# Patient Record
Sex: MALE | Race: WHITE | NOT HISPANIC OR LATINO | Employment: FULL TIME | URBAN - METROPOLITAN AREA
[De-identification: names, ages, dates, MRNs, and addresses within clinical notes are randomized per-mention and may not be internally consistent; named-entity substitution may affect disease eponyms.]

---

## 2021-11-02 ENCOUNTER — HOSPITAL ENCOUNTER (EMERGENCY)
Facility: HOSPITAL | Age: 26
Discharge: HOME | End: 2021-11-02
Attending: EMERGENCY MEDICINE | Admitting: EMERGENCY MEDICINE

## 2021-11-02 ENCOUNTER — APPOINTMENT (OUTPATIENT)
Dept: CT IMAGING | Facility: HOSPITAL | Age: 26
End: 2021-11-02

## 2021-11-02 VITALS
WEIGHT: 158 LBS | DIASTOLIC BLOOD PRESSURE: 101 MMHG | HEART RATE: 86 BPM | OXYGEN SATURATION: 100 % | HEIGHT: 69 IN | SYSTOLIC BLOOD PRESSURE: 150 MMHG | BODY MASS INDEX: 23.4 KG/M2 | TEMPERATURE: 98.4 F | RESPIRATION RATE: 18 BRPM

## 2021-11-02 DIAGNOSIS — R10.11 RIGHT UPPER QUADRANT ABDOMINAL PAIN: Primary | ICD-10-CM

## 2021-11-02 LAB
ALBUMIN SERPL-MCNC: 4.9 G/DL (ref 3.5–5.2)
ALBUMIN/GLOB SERPL: 2.1 G/DL
ALP SERPL-CCNC: 74 U/L (ref 39–117)
ALT SERPL W P-5'-P-CCNC: 33 U/L (ref 1–41)
AMPHET+METHAMPHET UR QL: NEGATIVE
ANION GAP SERPL CALCULATED.3IONS-SCNC: 15 MMOL/L (ref 5–15)
APAP SERPL-MCNC: <5 MCG/ML (ref 0–30)
APTT PPP: 27 SECONDS (ref 24–31)
AST SERPL-CCNC: 27 U/L (ref 1–40)
BARBITURATES UR QL SCN: NEGATIVE
BASOPHILS # BLD AUTO: 0 10*3/MM3 (ref 0–0.2)
BASOPHILS NFR BLD AUTO: 0.3 % (ref 0–1.5)
BENZODIAZ UR QL SCN: NEGATIVE
BILIRUB SERPL-MCNC: 0.5 MG/DL (ref 0–1.2)
BUN SERPL-MCNC: 10 MG/DL (ref 6–20)
BUN/CREAT SERPL: 13.5 (ref 7–25)
CALCIUM SPEC-SCNC: 9.8 MG/DL (ref 8.6–10.5)
CANNABINOIDS SERPL QL: NEGATIVE
CHLORIDE SERPL-SCNC: 99 MMOL/L (ref 98–107)
CK SERPL-CCNC: 73 U/L (ref 20–200)
CO2 SERPL-SCNC: 24 MMOL/L (ref 22–29)
COCAINE UR QL: POSITIVE
CREAT SERPL-MCNC: 0.74 MG/DL (ref 0.76–1.27)
DEPRECATED RDW RBC AUTO: 40.3 FL (ref 37–54)
EOSINOPHIL # BLD AUTO: 0.1 10*3/MM3 (ref 0–0.4)
EOSINOPHIL NFR BLD AUTO: 1.4 % (ref 0.3–6.2)
ERYTHROCYTE [DISTWIDTH] IN BLOOD BY AUTOMATED COUNT: 12.6 % (ref 12.3–15.4)
ETHANOL UR QL: <0.01 %
GFR SERPL CREATININE-BSD FRML MDRD: 128 ML/MIN/1.73
GFR SERPL CREATININE-BSD FRML MDRD: >150 ML/MIN/1.73
GLOBULIN UR ELPH-MCNC: 2.3 GM/DL
GLUCOSE SERPL-MCNC: 89 MG/DL (ref 65–99)
HAV IGM SERPL QL IA: NORMAL
HBV CORE IGM SERPL QL IA: NORMAL
HBV SURFACE AG SERPL QL IA: NORMAL
HCT VFR BLD AUTO: 44.1 % (ref 37.5–51)
HCV AB SER DONR QL: NORMAL
HGB BLD-MCNC: 15.2 G/DL (ref 13–17.7)
INR PPP: 1.03 (ref 0.93–1.1)
LIPASE SERPL-CCNC: 31 U/L (ref 13–60)
LYMPHOCYTES # BLD AUTO: 1.1 10*3/MM3 (ref 0.7–3.1)
LYMPHOCYTES NFR BLD AUTO: 13.2 % (ref 19.6–45.3)
MAGNESIUM SERPL-MCNC: 2 MG/DL (ref 1.6–2.6)
MCH RBC QN AUTO: 30.7 PG (ref 26.6–33)
MCHC RBC AUTO-ENTMCNC: 34.4 G/DL (ref 31.5–35.7)
MCV RBC AUTO: 89.1 FL (ref 79–97)
METHADONE UR QL SCN: NEGATIVE
MONOCYTES # BLD AUTO: 0.5 10*3/MM3 (ref 0.1–0.9)
MONOCYTES NFR BLD AUTO: 5.7 % (ref 5–12)
NEUTROPHILS NFR BLD AUTO: 6.7 10*3/MM3 (ref 1.7–7)
NEUTROPHILS NFR BLD AUTO: 79.4 % (ref 42.7–76)
NRBC BLD AUTO-RTO: 0 /100 WBC (ref 0–0.2)
OPIATES UR QL: NEGATIVE
OXYCODONE UR QL SCN: NEGATIVE
PLATELET # BLD AUTO: 232 10*3/MM3 (ref 140–450)
PMV BLD AUTO: 7.1 FL (ref 6–12)
POTASSIUM SERPL-SCNC: 3.8 MMOL/L (ref 3.5–5.2)
PROT SERPL-MCNC: 7.2 G/DL (ref 6–8.5)
PROTHROMBIN TIME: 11.4 SECONDS (ref 9.6–11.7)
RBC # BLD AUTO: 4.95 10*6/MM3 (ref 4.14–5.8)
SALICYLATES SERPL-MCNC: <0.3 MG/DL
SODIUM SERPL-SCNC: 138 MMOL/L (ref 136–145)
TSH SERPL DL<=0.05 MIU/L-ACNC: 0.77 UIU/ML (ref 0.27–4.2)
WBC # BLD AUTO: 8.4 10*3/MM3 (ref 3.4–10.8)

## 2021-11-02 PROCEDURE — 80307 DRUG TEST PRSMV CHEM ANLYZR: CPT | Performed by: EMERGENCY MEDICINE

## 2021-11-02 PROCEDURE — 99283 EMERGENCY DEPT VISIT LOW MDM: CPT

## 2021-11-02 PROCEDURE — 25010000002 THIAMINE PER 100 MG: Performed by: EMERGENCY MEDICINE

## 2021-11-02 PROCEDURE — 84443 ASSAY THYROID STIM HORMONE: CPT | Performed by: EMERGENCY MEDICINE

## 2021-11-02 PROCEDURE — 74177 CT ABD & PELVIS W/CONTRAST: CPT

## 2021-11-02 PROCEDURE — 80053 COMPREHEN METABOLIC PANEL: CPT | Performed by: EMERGENCY MEDICINE

## 2021-11-02 PROCEDURE — 80143 DRUG ASSAY ACETAMINOPHEN: CPT | Performed by: EMERGENCY MEDICINE

## 2021-11-02 PROCEDURE — 85730 THROMBOPLASTIN TIME PARTIAL: CPT | Performed by: EMERGENCY MEDICINE

## 2021-11-02 PROCEDURE — 96365 THER/PROPH/DIAG IV INF INIT: CPT

## 2021-11-02 PROCEDURE — 85610 PROTHROMBIN TIME: CPT | Performed by: EMERGENCY MEDICINE

## 2021-11-02 PROCEDURE — 0 IOPAMIDOL PER 1 ML: Performed by: EMERGENCY MEDICINE

## 2021-11-02 PROCEDURE — 83690 ASSAY OF LIPASE: CPT | Performed by: EMERGENCY MEDICINE

## 2021-11-02 PROCEDURE — 85025 COMPLETE CBC W/AUTO DIFF WBC: CPT | Performed by: EMERGENCY MEDICINE

## 2021-11-02 PROCEDURE — 82077 ASSAY SPEC XCP UR&BREATH IA: CPT | Performed by: EMERGENCY MEDICINE

## 2021-11-02 PROCEDURE — 83735 ASSAY OF MAGNESIUM: CPT | Performed by: EMERGENCY MEDICINE

## 2021-11-02 PROCEDURE — 82550 ASSAY OF CK (CPK): CPT | Performed by: EMERGENCY MEDICINE

## 2021-11-02 PROCEDURE — 80179 DRUG ASSAY SALICYLATE: CPT | Performed by: EMERGENCY MEDICINE

## 2021-11-02 PROCEDURE — 80074 ACUTE HEPATITIS PANEL: CPT | Performed by: EMERGENCY MEDICINE

## 2021-11-02 RX ORDER — SODIUM CHLORIDE 0.9 % (FLUSH) 0.9 %
10 SYRINGE (ML) INJECTION AS NEEDED
Status: DISCONTINUED | OUTPATIENT
Start: 2021-11-02 | End: 2021-11-02 | Stop reason: HOSPADM

## 2021-11-02 RX ADMIN — SODIUM CHLORIDE 1000 ML: 9 INJECTION, SOLUTION INTRAVENOUS at 16:49

## 2021-11-02 RX ADMIN — THIAMINE HYDROCHLORIDE 100 MG: 100 INJECTION, SOLUTION INTRAMUSCULAR; INTRAVENOUS at 18:29

## 2021-11-02 RX ADMIN — IOPAMIDOL 100 ML: 755 INJECTION, SOLUTION INTRAVENOUS at 20:44

## 2021-11-03 NOTE — ED PROVIDER NOTES
Subjective   Chief complaint some pain in my right upper abdomen and turning yellow    History of present illness 26-year-old male who states that he had been drinking pretty heavily on vacation recently he is concerned because he looks like his skin is turning yellow and his eyes are turning yellow and he is having some intermittent pain in his right upper abdomen.  He denies any fever chills sweats nausea but no vomiting.  No black or bloody stool urine has been normal.  No injury.  No other ill exposures.  He has done some cocaine as well but he denies any injectable drugs.  And no injury.  Symptoms are mild to moderate does not explain or worse ongoing for last few days continuous with the above associated symptoms.  No fevers no chills no night sweats or weight loss no rashes          Review of Systems   Constitutional: Negative for chills and fever.   HENT: Negative for congestion and sinus pressure.    Eyes: Negative for photophobia and visual disturbance.   Respiratory: Negative for chest tightness and shortness of breath.    Cardiovascular: Negative for chest pain and palpitations.   Gastrointestinal: Positive for abdominal pain. Negative for vomiting.   Endocrine: Negative for cold intolerance and heat intolerance.   Genitourinary: Negative for difficulty urinating and dysuria.   Musculoskeletal: Negative for arthralgias and back pain.   Skin: Negative for color change and pallor.   Neurological: Negative for dizziness and light-headedness.   Psychiatric/Behavioral: Negative for agitation and behavioral problems.       No past medical history on file.    No Known Allergies    No past surgical history on file.    No family history on file.    Social History     Socioeconomic History   • Marital status: Single   Patient has no health problems  Takes no medications.  Patient does drink alcohol vape use cocaine recently        Objective   Physical Exam  26-year-old awake alert no acute distress resting  comfortably HEENT extraocular static pupils equal reactive sclera clear mouth clear neck supple no adenopathy no meningeal signs lungs clear no retractions back no CVA tenderness heart regular without murmur abdomen soft nontender good bowel sounds no peritoneal findings no enlarged liver no large spleen extremities pulses are equal throughout upper and lower extremities no edema cords or Homans' sign or evidence DVT skin warm dry without cellulitic changes no jaundice neurologic awake alert orientated x3 no facial asymmetry normal speech without focal weakness  Procedures           ED Course      Results for orders placed or performed during the hospital encounter of 11/02/21   Comprehensive Metabolic Panel    Specimen: Blood   Result Value Ref Range    Glucose 89 65 - 99 mg/dL    BUN 10 6 - 20 mg/dL    Creatinine 0.74 (L) 0.76 - 1.27 mg/dL    Sodium 138 136 - 145 mmol/L    Potassium 3.8 3.5 - 5.2 mmol/L    Chloride 99 98 - 107 mmol/L    CO2 24.0 22.0 - 29.0 mmol/L    Calcium 9.8 8.6 - 10.5 mg/dL    Total Protein 7.2 6.0 - 8.5 g/dL    Albumin 4.90 3.50 - 5.20 g/dL    ALT (SGPT) 33 1 - 41 U/L    AST (SGOT) 27 1 - 40 U/L    Alkaline Phosphatase 74 39 - 117 U/L    Total Bilirubin 0.5 0.0 - 1.2 mg/dL    eGFR Non African Amer 128 >60 mL/min/1.73    eGFR  African Amer >150 >60 mL/min/1.73    Globulin 2.3 gm/dL    A/G Ratio 2.1 g/dL    BUN/Creatinine Ratio 13.5 7.0 - 25.0    Anion Gap 15.0 5.0 - 15.0 mmol/L   Protime-INR    Specimen: Blood   Result Value Ref Range    Protime 11.4 9.6 - 11.7 Seconds    INR 1.03 0.93 - 1.10   aPTT    Specimen: Blood   Result Value Ref Range    PTT 27.0 24.0 - 31.0 seconds   Lipase    Specimen: Blood   Result Value Ref Range    Lipase 31 13 - 60 U/L   Hepatitis Panel, Acute    Specimen: Blood   Result Value Ref Range    Hepatitis B Surface Ag Non-Reactive Non-Reactive    Hep A IgM Non-Reactive Non-Reactive    Hep B C IgM Non-Reactive Non-Reactive    Hepatitis C Ab Non-Reactive Non-Reactive    Acetaminophen Level    Specimen: Blood   Result Value Ref Range    Acetaminophen <5.0 0.0 - 30.0 mcg/mL   Ethanol    Specimen: Blood   Result Value Ref Range    Ethanol % <0.010 %   Urine Drug Screen - Urine, Clean Catch    Specimen: Urine, Clean Catch   Result Value Ref Range    Amphet/Methamphet, Screen Negative Negative    Barbiturates Screen, Urine Negative Negative    Benzodiazepine Screen, Urine Negative Negative    Cocaine Screen, Urine Positive (A) Negative    Opiate Screen Negative Negative    THC, Screen, Urine Negative Negative    Methadone Screen, Urine Negative Negative    Oxycodone Screen, Urine Negative Negative   Salicylate Level    Specimen: Blood   Result Value Ref Range    Salicylate <0.3 <=30.0 mg/dL   CK    Specimen: Blood   Result Value Ref Range    Creatine Kinase 73 20 - 200 U/L   Magnesium    Specimen: Blood   Result Value Ref Range    Magnesium 2.0 1.6 - 2.6 mg/dL   TSH    Specimen: Blood   Result Value Ref Range    TSH 0.768 0.270 - 4.200 uIU/mL   CBC Auto Differential    Specimen: Blood   Result Value Ref Range    WBC 8.40 3.40 - 10.80 10*3/mm3    RBC 4.95 4.14 - 5.80 10*6/mm3    Hemoglobin 15.2 13.0 - 17.7 g/dL    Hematocrit 44.1 37.5 - 51.0 %    MCV 89.1 79.0 - 97.0 fL    MCH 30.7 26.6 - 33.0 pg    MCHC 34.4 31.5 - 35.7 g/dL    RDW 12.6 12.3 - 15.4 %    RDW-SD 40.3 37.0 - 54.0 fl    MPV 7.1 6.0 - 12.0 fL    Platelets 232 140 - 450 10*3/mm3    Neutrophil % 79.4 (H) 42.7 - 76.0 %    Lymphocyte % 13.2 (L) 19.6 - 45.3 %    Monocyte % 5.7 5.0 - 12.0 %    Eosinophil % 1.4 0.3 - 6.2 %    Basophil % 0.3 0.0 - 1.5 %    Neutrophils, Absolute 6.70 1.70 - 7.00 10*3/mm3    Lymphocytes, Absolute 1.10 0.70 - 3.10 10*3/mm3    Monocytes, Absolute 0.50 0.10 - 0.90 10*3/mm3    Eosinophils, Absolute 0.10 0.00 - 0.40 10*3/mm3    Basophils, Absolute 0.00 0.00 - 0.20 10*3/mm3    nRBC 0.0 0.0 - 0.2 /100 WBC     CT Abdomen Pelvis With Contrast    Result Date: 11/2/2021  Normal CT of the abdomen and pelvis  with contrast.  Electronically Signed By-Vincent Alicea DO On:11/2/2021 9:01 PM This report was finalized on [id number] by  Vincent Alicea DO.    Medications   sodium chloride 0.9 % flush 10 mL (has no administration in time range)   sodium chloride 0.9 % bolus 1,000 mL (0 mL Intravenous Stopped 11/2/21 1829)   thiamine (B-1) 100 mg in sodium chloride 0.9 % 100 mL IVPB (0 mg Intravenous Stopped 11/2/21 1854)   iopamidol (ISOVUE-370) 76 % injection 100 mL (100 mL Intravenous Given 11/2/21 2044)                                            MDM  Number of Diagnoses or Management Options  Right upper quadrant abdominal pain: new and requires workup  Diagnosis management comments: Medical decision make.  Patient IV established a liter of saline thiamine 100 mg IV and had the above evaluation.  CBC unremarkable TSH magnesium CK aspirin Tylenol unremarkable drug screen showed cocaine hepatitis panel negative chemistries liver enzymes lipase coags were all normal.  CT abdomen pelvis was unremarkable per radiology reading.  On repeat exam patient is resting comfortably abdomen soft without tenderness no peritoneal findings we talked about the findings.  His liver enzymes look good his bilirubin is normal CT was okay hepatitis panel negative he was counseled on liver toxins occluding alcohol.  We also talked about Tylenol and other possible etiologies talked about gallbladder disease he looks well was discharged home for outpatient management and follow-up       Amount and/or Complexity of Data Reviewed  Clinical lab tests: reviewed  Tests in the radiology section of CPT®: reviewed    Risk of Complications, Morbidity, and/or Mortality  Presenting problems: high  Diagnostic procedures: high  Management options: high    Patient Progress  Patient progress: stable      Final diagnoses:   Right upper quadrant abdominal pain       ED Disposition  ED Disposition     ED Disposition Condition Comment    Discharge Stable            PATIENT CONNECTION - [facility] [id number]  [phone]  In 1 week           Medication List      No changes were made to your prescriptions during this visit.          Nilay Gary MD  11/02/21 [id number]

## 2021-11-03 NOTE — DISCHARGE INSTRUCTIONS
Return for fever vomiting blood black or bloody stool increasing pain dizziness passing out increasing turning yellow-brown urine bloody urine severe pain uncontrolled pain or any other new or worse problems or concerns.  Avoid alcohol.

## 2021-12-12 ENCOUNTER — HOSPITAL ENCOUNTER (EMERGENCY)
Facility: HOSPITAL | Age: 26
Discharge: HOME | End: 2021-12-12
Attending: EMERGENCY MEDICINE | Admitting: EMERGENCY MEDICINE

## 2021-12-12 VITALS
BODY MASS INDEX: 24.44 KG/M2 | TEMPERATURE: 98.8 F | OXYGEN SATURATION: 98 % | RESPIRATION RATE: 16 BRPM | HEIGHT: 69 IN | WEIGHT: 165 LBS | SYSTOLIC BLOOD PRESSURE: 147 MMHG | DIASTOLIC BLOOD PRESSURE: 78 MMHG | HEART RATE: 89 BPM

## 2021-12-12 DIAGNOSIS — T40.601A OPIATE OVERDOSE, ACCIDENTAL OR UNINTENTIONAL, INITIAL ENCOUNTER (HCC): Primary | ICD-10-CM

## 2021-12-12 PROCEDURE — 99283 EMERGENCY DEPT VISIT LOW MDM: CPT

## 2021-12-12 NOTE — ED PROVIDER NOTES
Subjective   26-year-old male admits to snorting a crushed tablet he thought was oxycodone.  Patient went unresponsive.  Medics gave Narcan with immediate resolution of altered mental status.  Patient also had some cocaine and alcohol but denies any other ingestion.  Patient feels well, denies any suicidal ideation or depression.          Review of Systems   Constitutional: Negative.    Respiratory: Negative.    Cardiovascular: Negative.    Neurological: Negative.        No past medical history on file.    No Known Allergies    No past surgical history on file.    No family history on file.    Social History     Socioeconomic History   • Marital status: Single           Objective   Physical Exam  Constitutional:       Appearance: Normal appearance.   HENT:      Head: Normocephalic.      Mouth/Throat:      Mouth: Mucous membranes are moist.      Pharynx: Oropharynx is clear.   Eyes:      Conjunctiva/sclera: Conjunctivae normal.      Pupils: Pupils are equal, round, and reactive to light.   Cardiovascular:      Rate and Rhythm: Normal rate and regular rhythm.      Heart sounds: Normal heart sounds.   Pulmonary:      Effort: Pulmonary effort is normal.      Breath sounds: Normal breath sounds.   Abdominal:      General: Bowel sounds are normal.      Palpations: Abdomen is soft.   Musculoskeletal:         General: Normal range of motion.   Skin:     General: Skin is warm and dry.      Capillary Refill: Capillary refill takes less than 2 seconds.   Neurological:      General: No focal deficit present.      Mental Status: He is alert and oriented to person, place, and time.   Psychiatric:         Mood and Affect: Mood normal.         Behavior: Behavior normal.         Procedures           ED Course                                                 MDM  Number of Diagnoses or Management Options  Opiate overdose, accidental or unintentional, initial encounter ([facility])  Diagnosis management comments: Patient has been alert  without complaints throughout ED encounter, no hypoxemia.  Patient does not feel he has an issue with substance abuse or addiction.      Final diagnoses:   Opiate overdose, accidental or unintentional, initial encounter ([facility])       ED Disposition  ED Disposition     ED Disposition Condition Comment    Discharge Stable           No follow-up provider specified.       Medication List      No changes were made to your prescriptions during this visit.          Nilay Carpenter MD  12/12/21 0733

## 2021-12-12 NOTE — ED NOTES
Patient states he took a percocet and woke up and everyone was around him.      Annmarie Clark RN  12/12/21 1977

## 2022-03-31 ENCOUNTER — TELEPHONE (OUTPATIENT)
Dept: ENDOCRINOLOGY | Facility: CLINIC | Age: 27
End: 2022-03-31

## 2022-04-28 ENCOUNTER — OFFICE VISIT (OUTPATIENT)
Dept: ENDOCRINOLOGY | Facility: CLINIC | Age: 27
End: 2022-04-28

## 2022-04-28 VITALS
SYSTOLIC BLOOD PRESSURE: 142 MMHG | BODY MASS INDEX: 22.66 KG/M2 | DIASTOLIC BLOOD PRESSURE: 75 MMHG | HEIGHT: 69 IN | OXYGEN SATURATION: 96 % | WEIGHT: 153 LBS | HEART RATE: 101 BPM

## 2022-04-28 DIAGNOSIS — D35.2 PITUITARY MICROADENOMA: ICD-10-CM

## 2022-04-28 DIAGNOSIS — E22.1 HYPERPROLACTINEMIA: Primary | ICD-10-CM

## 2022-04-28 PROCEDURE — 99204 OFFICE O/P NEW MOD 45 MIN: CPT | Performed by: INTERNAL MEDICINE

## 2022-04-28 RX ORDER — BUPROPION HYDROCHLORIDE 150 MG/1
150 TABLET ORAL DAILY
COMMUNITY
Start: 2022-04-11 | End: 2022-04-28

## 2022-04-28 RX ORDER — DEXTROAMPHETAMINE SACCHARATE, AMPHETAMINE ASPARTATE, DEXTROAMPHETAMINE SULFATE AND AMPHETAMINE SULFATE 5; 5; 5; 5 MG/1; MG/1; MG/1; MG/1
1 TABLET ORAL 2 TIMES DAILY
COMMUNITY
Start: 2022-04-06

## 2022-04-28 RX ORDER — LORAZEPAM 0.5 MG/1
0.5 TABLET ORAL 3 TIMES DAILY PRN
COMMUNITY
Start: 2022-04-21

## 2022-04-28 RX ORDER — SILDENAFIL CITRATE 20 MG/1
TABLET ORAL
COMMUNITY
Start: 2022-04-21

## 2022-04-28 NOTE — PROGRESS NOTES
Endocrine Consult Outpatient  Referred by Dr. Brown for high prolactin and microadenoma of pituitary gland consultation  Patient Care Team:  Provider, No Known as PCP - General     Chief Complaint: High prolactin and pituitary microadenoma        HPI: This is a 26-year-old male with history of anxiety disorder, panic attacks and ADHD, has been experiencing erectile dysfunction for last 2 years which has been progressively getting worse.  Libido has gone down.  Does have mood swings and fatigue and tiredness.  He was found to have mild high prolactin subsequently had a pituitary MRI which showed a possible 2 mm microadenoma.  His testosterone was normal and he is now referred here for further evaluation management.    They have tried to conceive but could not succeed, he feels that he may have a skateboard injury when he was scared but did not lead to much issues.  Denies any gynecomastia or other significant issues at this time.  No history of mumps or opioid use.    Past Medical History:   Diagnosis Date   • Hyperlipidemia    • Hypertension        Social History     Socioeconomic History   • Marital status: Single   • Number of children: 0   • Years of education: 14   Tobacco Use   • Smoking status: Never Smoker   • Smokeless tobacco: Never Used   Vaping Use   • Vaping Use: Every day   • Substances: Nicotine   • Devices: Disposable   Substance and Sexual Activity   • Alcohol use: Yes       Family History   Problem Relation Age of Onset   • Hypertension Father    • Heart disease Father    • Heart disease Maternal Grandmother    • Hypertension Maternal Grandmother        No Known Allergies    ROS:   Constitutional:  Admit fatigue, tiredness.    Eyes:  Denies change in visual acuity   HENT:  Denies nasal congestion or sore throat   Respiratory: denies cough, shortness of breath.   Cardiovascular:  denies chest pain, edema   GI:  Denies abdominal pain, nausea, vomiting.    :  Denies dysuria   Musculoskeletal:   Denies back pain or joint pain   Integument:  Denies dry skin, rash   Neurologic:  Denies headache, focal weakness or sensory changes   Endocrine:  Denies polyuria or polydipsia   Psychiatric:  Denies depression, admit anxiety      Vitals:    04/28/22 1358   BP: 142/75   Pulse: 101   SpO2: 96%     Body mass index is 22.59 kg/m².      Physical Exam:  GEN: NAD, conversant  EYES: EOMI, PERRL, no conjunctival erythema  NECK: no thyromegaly, full ROM   CV: RRR, no murmurs/rubs/gallops, no peripheral edema  LUNG: CTAB, no wheezes/rales/ronchi  CHEST: No gynecomastia noted  SKIN: no rashes, no acanthosis  MSK: no deformities, full ROM of all extremities  NEURO: no tremors, DTR normal  PSYCH: AOX3, appropriate mood, affect normal  : T score normal more than 15 mL on both sides and no masses noted.      Results Review:     I reviewed the patient's new clinical results.    Lab Results   Component Value Date    GLUCOSE 89 11/02/2021    BUN 10 11/02/2021    CREATININE 0.74 (L) 11/02/2021    EGFRIFNONA 128 11/02/2021    EGFRIFAFRI >150 11/02/2021    BCR 13.5 11/02/2021    K 3.8 11/02/2021    CO2 24.0 11/02/2021    CALCIUM 9.8 11/02/2021    ALBUMIN 4.90 11/02/2021    AST 27 11/02/2021    ALT 33 11/02/2021     No results found for: HGBA1C  Lab Results   Component Value Date    CREATININE 0.74 (L) 11/02/2021     Data reviewed: Labs from February 7, 2022 showed a white count of 4.3, hemoglobin 15.9, hematocrit 46.4, platelet count 319  Serum sodium was 142, potassium 4.3, chloride 104, CO2 23, glucose 87, BUN 11, creatinine 1.86, AST 20 and ALT 14,  Total testosterone was 487 with free testosterone of 13.1 both of them normal  Free T4 1.25, TSH 1.26, LH was 4.4, FSH was 1.9, prolactin 25.3 and estradiol level was 31.7  Pituitary MRI was done on February 22, 2022 showed a 2 mm hypoenhancing focus seen on only 2 of the coronal dynamic postcontrast consequences representing a microadenoma.  Was present on the right side of the  pituitary.    Medication Review: Reviewed.       Current Outpatient Medications:   •  amphetamine-dextroamphetamine (ADDERALL) 20 MG tablet, Take 1 tablet by mouth 2 (Two) Times a Day., Disp: , Rfl:   •  LORazepam (ATIVAN) 0.5 MG tablet, Take 0.5 mg by mouth 3 (Three) Times a Day As Needed., Disp: , Rfl:   •  sildenafil (REVATIO) 20 MG tablet, TAKE 1-5 TABLETS BY MOUTH 30 MINS TO 1 HOURS PRIOR TO ACTIVITY, Disp: , Rfl:     Assessment/Plan   Hyperprolactinemia: This is a 26-year-old male with history of erectile dysfunction who was found to have mild high prolactin and subsequently had a pituitary microadenoma.  At this time I will recheck prolactin and do further pituitary testing including TSH, free T4, total free testosterone, LH, FSH, SHBG, a.m. cortisol with ACTH level and IGF-I level for further evaluation.  Once we have these labs we will make further recommendations.  If he does have persistent high prolactin and it may be worth trying either cabergoline or bromocriptine to see if that helps him symptomatically.  I explained this to him.    Pituitary microadenoma: MRI is consistent with a 2 mm microadenoma, will check above hormones and recommended repeat MRI in 6 months.    Thank you very much for the consultation.           Sarah Garcia MD FACE.

## 2022-04-28 NOTE — PATIENT INSTRUCTIONS
Please get your labs done fasting in the next few days and make sure they are drawn before 10 AM  Arrange for pituitary MRI in August 2022.

## 2022-05-02 ENCOUNTER — LAB (OUTPATIENT)
Dept: LAB | Facility: HOSPITAL | Age: 27
End: 2022-05-02

## 2022-05-02 DIAGNOSIS — D35.2 PITUITARY MICROADENOMA: ICD-10-CM

## 2022-05-02 DIAGNOSIS — E22.1 HYPERPROLACTINEMIA: ICD-10-CM

## 2022-05-02 LAB
CORTIS SERPL-MCNC: 17.5 MCG/DL
FSH SERPL-ACNC: 1.57 MIU/ML
LH SERPL-ACNC: 3.68 MIU/ML
PROLACTIN SERPL-MCNC: 20.6 NG/ML (ref 4.04–15.2)
T4 FREE SERPL-MCNC: 1.11 NG/DL (ref 0.93–1.7)
TSH SERPL DL<=0.05 MIU/L-ACNC: 2.31 UIU/ML (ref 0.27–4.2)

## 2022-05-02 PROCEDURE — 83001 ASSAY OF GONADOTROPIN (FSH): CPT

## 2022-05-02 PROCEDURE — 84305 ASSAY OF SOMATOMEDIN: CPT

## 2022-05-02 PROCEDURE — 84439 ASSAY OF FREE THYROXINE: CPT

## 2022-05-02 PROCEDURE — 84443 ASSAY THYROID STIM HORMONE: CPT

## 2022-05-02 PROCEDURE — 82024 ASSAY OF ACTH: CPT

## 2022-05-02 PROCEDURE — 84403 ASSAY OF TOTAL TESTOSTERONE: CPT

## 2022-05-02 PROCEDURE — 82533 TOTAL CORTISOL: CPT

## 2022-05-02 PROCEDURE — 83002 ASSAY OF GONADOTROPIN (LH): CPT

## 2022-05-02 PROCEDURE — 36415 COLL VENOUS BLD VENIPUNCTURE: CPT

## 2022-05-02 PROCEDURE — 84402 ASSAY OF FREE TESTOSTERONE: CPT

## 2022-05-02 PROCEDURE — 84146 ASSAY OF PROLACTIN: CPT

## 2022-05-03 LAB
ACTH PLAS-MCNC: 40.9 PG/ML (ref 7.2–63.3)
IGF-I SERPL-MCNC: 186 NG/ML (ref 101–307)

## 2022-05-04 LAB
TESTOST FREE SERPL-MCNC: 17.5 PG/ML (ref 9.3–26.5)
TESTOST SERPL-MCNC: 675 NG/DL (ref 264–916)

## 2022-05-13 DIAGNOSIS — E22.1 HYPERPROLACTINEMIA: Primary | ICD-10-CM

## 2022-08-23 ENCOUNTER — HOSPITAL ENCOUNTER (OUTPATIENT)
Dept: MRI IMAGING | Facility: HOSPITAL | Age: 27
Discharge: HOME | End: 2022-08-23
Admitting: INTERNAL MEDICINE

## 2022-08-23 DIAGNOSIS — D35.2 PITUITARY MICROADENOMA: ICD-10-CM

## 2022-08-23 DIAGNOSIS — E22.1 HYPERPROLACTINEMIA: ICD-10-CM

## 2022-08-23 PROCEDURE — 25010000002 GADOTERIDOL PER 1 ML: Performed by: INTERNAL MEDICINE

## 2022-08-23 PROCEDURE — A9579 GAD-BASE MR CONTRAST NOS,1ML: HCPCS | Performed by: INTERNAL MEDICINE

## 2022-08-23 PROCEDURE — 70553 MRI BRAIN STEM W/O & W/DYE: CPT

## 2022-08-23 RX ADMIN — GADOTERIDOL 20 ML: 279.3 INJECTION, SOLUTION INTRAVENOUS at 09:10

## 2022-10-26 RX ORDER — BUPROPION HYDROCHLORIDE 150 MG/1
TABLET ORAL
COMMUNITY
Start: 2022-08-20

## 2022-10-26 RX ORDER — CHLORHEXIDINE GLUCONATE 0.12 MG/ML
RINSE ORAL
COMMUNITY
Start: 2022-07-30

## 2024-07-08 ENCOUNTER — OFFICE (OUTPATIENT)
Dept: URBAN - METROPOLITAN AREA CLINIC 64 | Facility: CLINIC | Age: 29
End: 2024-07-08

## 2024-07-08 VITALS
WEIGHT: 160 LBS | DIASTOLIC BLOOD PRESSURE: 84 MMHG | SYSTOLIC BLOOD PRESSURE: 133 MMHG | HEART RATE: 91 BPM | HEIGHT: 69 IN

## 2024-07-08 DIAGNOSIS — K62.5 HEMORRHAGE OF ANUS AND RECTUM: ICD-10-CM

## 2024-07-08 DIAGNOSIS — R19.4 CHANGE IN BOWEL HABIT: ICD-10-CM

## 2024-07-08 DIAGNOSIS — L29.0 PRURITUS ANI: ICD-10-CM

## 2024-07-08 PROCEDURE — 99204 OFFICE O/P NEW MOD 45 MIN: CPT | Performed by: INTERNAL MEDICINE

## 2025-04-20 PROCEDURE — 87081 CULTURE SCREEN ONLY: CPT | Performed by: NURSE PRACTITIONER

## 2025-05-14 ENCOUNTER — TRANSCRIBE ORDERS (OUTPATIENT)
Dept: ADMINISTRATIVE | Facility: HOSPITAL | Age: 30
End: 2025-05-14
Payer: COMMERCIAL

## 2025-05-14 DIAGNOSIS — D35.2 PITUITARY MICROADENOMA: Primary | ICD-10-CM

## 2025-05-15 PROCEDURE — 87081 CULTURE SCREEN ONLY: CPT | Performed by: PHYSICIAN ASSISTANT

## 2025-07-22 ENCOUNTER — PATIENT ROUNDING (BHMG ONLY) (OUTPATIENT)
Dept: URGENT CARE | Facility: CLINIC | Age: 30
End: 2025-07-22
Payer: COMMERCIAL

## 2025-07-22 NOTE — ED NOTES
Thank you for letting us care for you in your recent visit to our urgent care center. We would love to hear about your experience with us. Was this the first time you have visited our location?    We’re always looking for ways to make our patients’ experiences even better. Do you have any recommendations on ways we may improve?     I appreciate you taking the time to respond. Please be on the lookout for a survey about your recent visit from [employer] via text or email. We would greatly appreciate if you could fill that out and turn it back in. We want your voice to be heard and we value your feedback.   Thank you for choosing [facility] for your healthcare needs.

## 2025-08-14 ENCOUNTER — OFFICE (AMBULATORY)
Dept: URBAN - METROPOLITAN AREA PATHOLOGY 19 | Facility: PATHOLOGY | Age: 30
End: 2025-08-14

## 2025-08-14 ENCOUNTER — ON CAMPUS - OUTPATIENT (AMBULATORY)
Dept: URBAN - METROPOLITAN AREA HOSPITAL 2 | Facility: HOSPITAL | Age: 30
End: 2025-08-14

## 2025-08-14 VITALS
SYSTOLIC BLOOD PRESSURE: 127 MMHG | HEIGHT: 69 IN | DIASTOLIC BLOOD PRESSURE: 95 MMHG | HEART RATE: 76 BPM | DIASTOLIC BLOOD PRESSURE: 78 MMHG | RESPIRATION RATE: 16 BRPM | DIASTOLIC BLOOD PRESSURE: 87 MMHG | WEIGHT: 161 LBS | HEART RATE: 79 BPM | SYSTOLIC BLOOD PRESSURE: 135 MMHG | HEART RATE: 84 BPM | SYSTOLIC BLOOD PRESSURE: 133 MMHG | RESPIRATION RATE: 14 BRPM | SYSTOLIC BLOOD PRESSURE: 131 MMHG | HEART RATE: 67 BPM | SYSTOLIC BLOOD PRESSURE: 129 MMHG | OXYGEN SATURATION: 100 % | OXYGEN SATURATION: 99 % | SYSTOLIC BLOOD PRESSURE: 125 MMHG | HEART RATE: 74 BPM | RESPIRATION RATE: 18 BRPM | TEMPERATURE: 98.2 F | HEART RATE: 77 BPM | HEART RATE: 81 BPM | DIASTOLIC BLOOD PRESSURE: 77 MMHG | OXYGEN SATURATION: 98 % | SYSTOLIC BLOOD PRESSURE: 123 MMHG | HEART RATE: 78 BPM | DIASTOLIC BLOOD PRESSURE: 86 MMHG | DIASTOLIC BLOOD PRESSURE: 88 MMHG | SYSTOLIC BLOOD PRESSURE: 128 MMHG

## 2025-08-14 DIAGNOSIS — K62.1 RECTAL POLYP: ICD-10-CM

## 2025-08-14 DIAGNOSIS — K62.5 HEMORRHAGE OF ANUS AND RECTUM: ICD-10-CM

## 2025-08-14 DIAGNOSIS — R19.4 CHANGE IN BOWEL HABIT: ICD-10-CM

## 2025-08-14 DIAGNOSIS — L29.0 PRURITUS ANI: ICD-10-CM

## 2025-08-14 LAB
GI HISTOLOGY: A. RECTUM: (no result)
GI HISTOLOGY: PDF REPORT: (no result)

## 2025-08-14 PROCEDURE — 88305 TISSUE EXAM BY PATHOLOGIST: CPT | Performed by: PATHOLOGY

## 2025-08-14 PROCEDURE — 45385 COLONOSCOPY W/LESION REMOVAL: CPT | Performed by: INTERNAL MEDICINE
